# Patient Record
Sex: FEMALE | Race: WHITE | NOT HISPANIC OR LATINO | URBAN - METROPOLITAN AREA
[De-identification: names, ages, dates, MRNs, and addresses within clinical notes are randomized per-mention and may not be internally consistent; named-entity substitution may affect disease eponyms.]

---

## 2023-03-22 ENCOUNTER — APPOINTMENT (RX ONLY)
Dept: URBAN - METROPOLITAN AREA CLINIC 56 | Facility: CLINIC | Age: 48
Setting detail: DERMATOLOGY
End: 2023-03-22

## 2023-03-22 DIAGNOSIS — Z41.9 ENCOUNTER FOR PROCEDURE FOR PURPOSES OTHER THAN REMEDYING HEALTH STATE, UNSPECIFIED: ICD-10-CM

## 2023-03-22 PROCEDURE — ? ADDITIONAL NOTES

## 2023-03-22 PROCEDURE — ? COSMETIC CONSULTATION: BOTOX

## 2023-03-22 NOTE — PROCEDURE: ADDITIONAL NOTES
Additional Notes: Could not do laser due to the metal plate in the nose\\n\\nCould not improve the scars any further\\n\\nWe spoke about Botox for the glabella area that will help with the scar due to her frowning it shows the scar\\n\\nPt declined to get Botox at this time
Render Risk Assessment In Note?: no
Detail Level: Detailed